# Patient Record
Sex: MALE | Race: WHITE | Employment: OTHER | URBAN - METROPOLITAN AREA
[De-identification: names, ages, dates, MRNs, and addresses within clinical notes are randomized per-mention and may not be internally consistent; named-entity substitution may affect disease eponyms.]

---

## 2018-01-01 ENCOUNTER — HOSPITAL ENCOUNTER (INPATIENT)
Age: 83
LOS: 1 days | DRG: 177 | End: 2018-12-01
Attending: EMERGENCY MEDICINE | Admitting: INTERNAL MEDICINE
Payer: MEDICARE

## 2018-01-01 ENCOUNTER — ANESTHESIA (OUTPATIENT)
Dept: ENDOSCOPY | Age: 83
DRG: 194 | End: 2018-01-01
Payer: MEDICARE

## 2018-01-01 ENCOUNTER — APPOINTMENT (OUTPATIENT)
Dept: GENERAL RADIOLOGY | Age: 83
DRG: 177 | End: 2018-01-01
Payer: MEDICARE

## 2018-01-01 ENCOUNTER — APPOINTMENT (OUTPATIENT)
Dept: GENERAL RADIOLOGY | Age: 83
DRG: 194 | End: 2018-01-01
Payer: MEDICARE

## 2018-01-01 ENCOUNTER — APPOINTMENT (OUTPATIENT)
Dept: CT IMAGING | Age: 83
DRG: 194 | End: 2018-01-01
Payer: MEDICARE

## 2018-01-01 ENCOUNTER — ANESTHESIA EVENT (OUTPATIENT)
Dept: ENDOSCOPY | Age: 83
DRG: 194 | End: 2018-01-01
Payer: MEDICARE

## 2018-01-01 ENCOUNTER — HOSPITAL ENCOUNTER (INPATIENT)
Age: 83
LOS: 3 days | Discharge: HOME | DRG: 194 | End: 2018-09-13
Attending: EMERGENCY MEDICINE | Admitting: INTERNAL MEDICINE
Payer: MEDICARE

## 2018-01-01 VITALS
RESPIRATION RATE: 24 BRPM | SYSTOLIC BLOOD PRESSURE: 161 MMHG | OXYGEN SATURATION: 95 % | DIASTOLIC BLOOD PRESSURE: 85 MMHG

## 2018-01-01 VITALS
TEMPERATURE: 98.6 F | RESPIRATION RATE: 20 BRPM | OXYGEN SATURATION: 93 % | HEIGHT: 66 IN | BODY MASS INDEX: 28.21 KG/M2 | DIASTOLIC BLOOD PRESSURE: 79 MMHG | SYSTOLIC BLOOD PRESSURE: 182 MMHG | HEART RATE: 74 BPM | WEIGHT: 175.5 LBS

## 2018-01-01 VITALS
TEMPERATURE: 97.4 F | WEIGHT: 172.5 LBS | DIASTOLIC BLOOD PRESSURE: 62 MMHG | RESPIRATION RATE: 30 BRPM | BODY MASS INDEX: 27.72 KG/M2 | SYSTOLIC BLOOD PRESSURE: 86 MMHG | HEART RATE: 86 BPM | HEIGHT: 66 IN | OXYGEN SATURATION: 100 %

## 2018-01-01 DIAGNOSIS — R77.8 ELEVATED TROPONIN: ICD-10-CM

## 2018-01-01 DIAGNOSIS — J96.00 ACUTE RESPIRATORY FAILURE, UNSPECIFIED WHETHER WITH HYPOXIA OR HYPERCAPNIA (HCC): ICD-10-CM

## 2018-01-01 DIAGNOSIS — J69.0 ASPIRATION PNEUMONIA, UNSPECIFIED ASPIRATION PNEUMONIA TYPE, UNSPECIFIED LATERALITY, UNSPECIFIED PART OF LUNG (HCC): Primary | ICD-10-CM

## 2018-01-01 DIAGNOSIS — J18.9 COMMUNITY ACQUIRED PNEUMONIA, UNSPECIFIED LATERALITY: Primary | ICD-10-CM

## 2018-01-01 LAB
AADO2: 368.7 MMHG
AADO2: 540.9 MMHG
ABO/RH: NORMAL
ALBUMIN SERPL-MCNC: 3.6 G/DL (ref 3.5–5.2)
ALBUMIN SERPL-MCNC: 3.9 G/DL (ref 3.5–5.2)
ALP BLD-CCNC: 128 U/L (ref 40–129)
ALP BLD-CCNC: 145 U/L (ref 40–129)
ALT SERPL-CCNC: 30 U/L (ref 0–40)
ALT SERPL-CCNC: 40 U/L (ref 0–40)
AMORPHOUS: ABNORMAL
ANION GAP SERPL CALCULATED.3IONS-SCNC: 11 MMOL/L (ref 7–16)
ANION GAP SERPL CALCULATED.3IONS-SCNC: 12 MMOL/L (ref 7–16)
ANION GAP SERPL CALCULATED.3IONS-SCNC: 12 MMOL/L (ref 7–16)
ANION GAP SERPL CALCULATED.3IONS-SCNC: 14 MMOL/L (ref 7–16)
ANION GAP SERPL CALCULATED.3IONS-SCNC: 17 MMOL/L (ref 7–16)
ANTIBODY SCREEN: NORMAL
AST SERPL-CCNC: 25 U/L (ref 0–39)
AST SERPL-CCNC: 45 U/L (ref 0–39)
ATYPICAL LYMPHOCYTE RELATIVE PERCENT: 0.9 % (ref 0–4)
B.E.: -10.9 MMOL/L (ref -3–3)
B.E.: -4.6 MMOL/L (ref -3–3)
BACTERIA: ABNORMAL /HPF
BACTERIA: NORMAL /HPF
BASOPHILS ABSOLUTE: 0 E9/L (ref 0–0.2)
BASOPHILS ABSOLUTE: 0.05 E9/L (ref 0–0.2)
BASOPHILS RELATIVE PERCENT: 0.3 % (ref 0–2)
BASOPHILS RELATIVE PERCENT: 0.5 % (ref 0–2)
BILIRUB SERPL-MCNC: 0.3 MG/DL (ref 0–1.2)
BILIRUB SERPL-MCNC: 0.4 MG/DL (ref 0–1.2)
BILIRUBIN URINE: NEGATIVE
BILIRUBIN URINE: NEGATIVE
BLOOD CULTURE, ROUTINE: NORMAL
BLOOD, URINE: ABNORMAL
BLOOD, URINE: NEGATIVE
BUN BLDV-MCNC: 18 MG/DL (ref 8–23)
BUN BLDV-MCNC: 24 MG/DL (ref 8–23)
BUN BLDV-MCNC: 28 MG/DL (ref 8–23)
BUN BLDV-MCNC: 28 MG/DL (ref 8–23)
BUN BLDV-MCNC: 36 MG/DL (ref 8–23)
BURR CELLS: ABNORMAL
CALCIUM SERPL-MCNC: 8.6 MG/DL (ref 8.6–10.2)
CALCIUM SERPL-MCNC: 8.7 MG/DL (ref 8.6–10.2)
CALCIUM SERPL-MCNC: 8.8 MG/DL (ref 8.6–10.2)
CALCIUM SERPL-MCNC: 9.3 MG/DL (ref 8.6–10.2)
CALCIUM SERPL-MCNC: 9.4 MG/DL (ref 8.6–10.2)
CHLORIDE BLD-SCNC: 100 MMOL/L (ref 98–107)
CHLORIDE BLD-SCNC: 102 MMOL/L (ref 98–107)
CHLORIDE BLD-SCNC: 104 MMOL/L (ref 98–107)
CHLORIDE BLD-SCNC: 107 MMOL/L (ref 98–107)
CHLORIDE BLD-SCNC: 107 MMOL/L (ref 98–107)
CLARITY: CLEAR
CLARITY: CLEAR
CO2: 18 MMOL/L (ref 22–29)
CO2: 18 MMOL/L (ref 22–29)
CO2: 19 MMOL/L (ref 22–29)
CO2: 21 MMOL/L (ref 22–29)
CO2: 24 MMOL/L (ref 22–29)
COHB: 0.5 % (ref 0–1.5)
COHB: 0.6 % (ref 0–1.5)
COLOR: YELLOW
COLOR: YELLOW
CREAT SERPL-MCNC: 1.2 MG/DL (ref 0.7–1.2)
CREAT SERPL-MCNC: 1.3 MG/DL (ref 0.7–1.2)
CREAT SERPL-MCNC: 1.3 MG/DL (ref 0.7–1.2)
CREAT SERPL-MCNC: 1.4 MG/DL (ref 0.7–1.2)
CREAT SERPL-MCNC: 1.7 MG/DL (ref 0.7–1.2)
CRITICAL: ABNORMAL
CRITICAL: ABNORMAL
CULTURE, BLOOD 2: NORMAL
DATE ANALYZED: ABNORMAL
DATE ANALYZED: ABNORMAL
DATE OF COLLECTION: ABNORMAL
DATE OF COLLECTION: ABNORMAL
EKG ATRIAL RATE: 60 BPM
EKG P AXIS: 33 DEGREES
EKG P-R INTERVAL: 172 MS
EKG Q-T INTERVAL: 440 MS
EKG QRS DURATION: 92 MS
EKG QTC CALCULATION (BAZETT): 440 MS
EKG R AXIS: -51 DEGREES
EKG T AXIS: 150 DEGREES
EKG VENTRICULAR RATE: 60 BPM
EOSINOPHILS ABSOLUTE: 0 E9/L (ref 0.05–0.5)
EOSINOPHILS ABSOLUTE: 0.66 E9/L (ref 0.05–0.5)
EOSINOPHILS RELATIVE PERCENT: 0.4 % (ref 0–6)
EOSINOPHILS RELATIVE PERCENT: 6 % (ref 0–6)
FILM ARRAY ADENOVIRUS: NORMAL
FILM ARRAY BORDETELLA PERTUSSIS: NORMAL
FILM ARRAY CHLAMYDOPHILIA PNEUMONIAE: NORMAL
FILM ARRAY CORONAVIRUS 229E: NORMAL
FILM ARRAY CORONAVIRUS HKU1: NORMAL
FILM ARRAY CORONAVIRUS NL63: NORMAL
FILM ARRAY CORONAVIRUS OC43: NORMAL
FILM ARRAY INFLUENZA A VIRUS 09H1: NORMAL
FILM ARRAY INFLUENZA A VIRUS H1: NORMAL
FILM ARRAY INFLUENZA A VIRUS H3: NORMAL
FILM ARRAY INFLUENZA A VIRUS: NORMAL
FILM ARRAY INFLUENZA B: NORMAL
FILM ARRAY METAPNEUMOVIRUS: NORMAL
FILM ARRAY MYCOPLASMA PNEUMONIAE: NORMAL
FILM ARRAY PARAINFLUENZA VIRUS 1: NORMAL
FILM ARRAY PARAINFLUENZA VIRUS 2: NORMAL
FILM ARRAY PARAINFLUENZA VIRUS 3: NORMAL
FILM ARRAY PARAINFLUENZA VIRUS 4: NORMAL
FILM ARRAY RESPIRATORY SYNCITIAL VIRUS: NORMAL
FILM ARRAY RHINOVIRUS/ENTEROVIRUS: NORMAL
FIO2: 100 %
FIO2: 100 %
GFR AFRICAN AMERICAN: 47
GFR AFRICAN AMERICAN: 58
GFR AFRICAN AMERICAN: >60
GFR NON-AFRICAN AMERICAN: 39 ML/MIN/1.73
GFR NON-AFRICAN AMERICAN: 48 ML/MIN/1.73
GFR NON-AFRICAN AMERICAN: 53 ML/MIN/1.73
GFR NON-AFRICAN AMERICAN: 53 ML/MIN/1.73
GFR NON-AFRICAN AMERICAN: 58 ML/MIN/1.73
GLUCOSE BLD-MCNC: 124 MG/DL (ref 74–99)
GLUCOSE BLD-MCNC: 85 MG/DL (ref 74–109)
GLUCOSE BLD-MCNC: 86 MG/DL (ref 74–109)
GLUCOSE BLD-MCNC: 86 MG/DL (ref 74–109)
GLUCOSE BLD-MCNC: 96 MG/DL (ref 74–109)
GLUCOSE URINE: NEGATIVE MG/DL
GLUCOSE URINE: NEGATIVE MG/DL
HCO3: 17.6 MMOL/L (ref 22–26)
HCO3: 19.3 MMOL/L (ref 22–26)
HCT VFR BLD CALC: 37.4 % (ref 37–54)
HCT VFR BLD CALC: 38.3 % (ref 37–54)
HCT VFR BLD CALC: 39.5 % (ref 37–54)
HCT VFR BLD CALC: 44 % (ref 37–54)
HCT VFR BLD CALC: 48.3 % (ref 37–54)
HEMOGLOBIN: 12.5 G/DL (ref 12.5–16.5)
HEMOGLOBIN: 12.5 G/DL (ref 12.5–16.5)
HEMOGLOBIN: 13.4 G/DL (ref 12.5–16.5)
HEMOGLOBIN: 14.8 G/DL (ref 12.5–16.5)
HEMOGLOBIN: 15.9 G/DL (ref 12.5–16.5)
HHB: 0.4 % (ref 0–5)
HHB: 3.7 % (ref 0–5)
IMMATURE GRANULOCYTES #: 0.06 E9/L
IMMATURE GRANULOCYTES %: 0.5 % (ref 0–5)
INR BLD: 1.1
INR BLD: 1.1
KETONES, URINE: ABNORMAL MG/DL
KETONES, URINE: NEGATIVE MG/DL
LAB: ABNORMAL
LAB: ABNORMAL
LACTIC ACID: 1.4 MMOL/L (ref 0.5–2.2)
LACTIC ACID: 2.3 MMOL/L (ref 0.5–2.2)
LACTIC ACID: 3.1 MMOL/L (ref 0.5–2.2)
LEUKOCYTE ESTERASE, URINE: NEGATIVE
LEUKOCYTE ESTERASE, URINE: NEGATIVE
LYMPHOCYTES ABSOLUTE: 1.36 E9/L (ref 1.5–4)
LYMPHOCYTES ABSOLUTE: 1.42 E9/L (ref 1.5–4)
LYMPHOCYTES RELATIVE PERCENT: 12.4 % (ref 20–42)
LYMPHOCYTES RELATIVE PERCENT: 5.2 % (ref 20–42)
Lab: ABNORMAL
Lab: ABNORMAL
MCH RBC QN AUTO: 30.5 PG (ref 26–35)
MCH RBC QN AUTO: 30.6 PG (ref 26–35)
MCH RBC QN AUTO: 31.5 PG (ref 26–35)
MCH RBC QN AUTO: 31.6 PG (ref 26–35)
MCH RBC QN AUTO: 31.8 PG (ref 26–35)
MCHC RBC AUTO-ENTMCNC: 32.6 % (ref 32–34.5)
MCHC RBC AUTO-ENTMCNC: 32.9 % (ref 32–34.5)
MCHC RBC AUTO-ENTMCNC: 33.4 % (ref 32–34.5)
MCHC RBC AUTO-ENTMCNC: 33.6 % (ref 32–34.5)
MCHC RBC AUTO-ENTMCNC: 33.9 % (ref 32–34.5)
MCV RBC AUTO: 92.7 FL (ref 80–99.9)
MCV RBC AUTO: 93.1 FL (ref 80–99.9)
MCV RBC AUTO: 93.4 FL (ref 80–99.9)
MCV RBC AUTO: 94.6 FL (ref 80–99.9)
MCV RBC AUTO: 94.7 FL (ref 80–99.9)
METAMYELOCYTES RELATIVE PERCENT: 1.7 % (ref 0–1)
METER GLUCOSE: 145 MG/DL (ref 74–99)
METHB: 0.2 % (ref 0–1.5)
METHB: 0.3 % (ref 0–1.5)
MODE: ABNORMAL
MODE: ABNORMAL
MONOCYTES ABSOLUTE: 0.71 E9/L (ref 0.1–0.95)
MONOCYTES ABSOLUTE: 1.13 E9/L (ref 0.1–0.95)
MONOCYTES RELATIVE PERCENT: 10.3 % (ref 2–12)
MONOCYTES RELATIVE PERCENT: 2.6 % (ref 2–12)
NEUTROPHILS ABSOLUTE: 21.57 E9/L (ref 1.8–7.3)
NEUTROPHILS ABSOLUTE: 7.72 E9/L (ref 1.8–7.3)
NEUTROPHILS RELATIVE PERCENT: 70.3 % (ref 43–80)
NEUTROPHILS RELATIVE PERCENT: 89.6 % (ref 43–80)
NITRITE, URINE: NEGATIVE
NITRITE, URINE: NEGATIVE
O2 CONTENT: 22.5 ML/DL
O2 CONTENT: 22.6 ML/DL
O2 SATURATION: 96.3 % (ref 92–98.5)
O2 SATURATION: 99.6 % (ref 92–98.5)
O2HB: 95.4 % (ref 94–97)
O2HB: 98.9 % (ref 94–97)
OPERATOR ID: ABNORMAL
OPERATOR ID: ABNORMAL
PATIENT TEMP: 37 C
PATIENT TEMP: 37 C
PCO2: 32.7 MMHG (ref 35–45)
PCO2: 48.1 MMHG (ref 35–45)
PDW BLD-RTO: 13.9 FL (ref 11.5–15)
PDW BLD-RTO: 14.1 FL (ref 11.5–15)
PDW BLD-RTO: 14.2 FL (ref 11.5–15)
PDW BLD-RTO: 14.2 FL (ref 11.5–15)
PDW BLD-RTO: 14.3 FL (ref 11.5–15)
PEEP/CPAP: 5 CMH2O
PEEP/CPAP: 8 CMH2O
PFO2: 0.99 MMHG/%
PFO2: 2.87 MMHG/%
PH BLOOD GAS: 7.18 (ref 7.35–7.45)
PH BLOOD GAS: 7.39 (ref 7.35–7.45)
PH UA: 5 (ref 5–9)
PH UA: 6.5 (ref 5–9)
PIP: 15 CMH2O
PIP: 15 CMH2O
PLATELET # BLD: 218 E9/L (ref 130–450)
PLATELET # BLD: 236 E9/L (ref 130–450)
PLATELET # BLD: 263 E9/L (ref 130–450)
PLATELET # BLD: 267 E9/L (ref 130–450)
PLATELET # BLD: 318 E9/L (ref 130–450)
PMV BLD AUTO: 10.4 FL (ref 7–12)
PMV BLD AUTO: 10.6 FL (ref 7–12)
PMV BLD AUTO: 10.6 FL (ref 7–12)
PMV BLD AUTO: 10.8 FL (ref 7–12)
PMV BLD AUTO: 10.9 FL (ref 7–12)
PO2: 286.6 MMHG (ref 60–100)
PO2: 99 MMHG (ref 60–100)
POLYCHROMASIA: ABNORMAL
POTASSIUM SERPL-SCNC: 4.3 MMOL/L (ref 3.5–5)
POTASSIUM SERPL-SCNC: 4.5 MMOL/L (ref 3.5–5)
POTASSIUM SERPL-SCNC: 4.6 MMOL/L (ref 3.5–5)
POTASSIUM SERPL-SCNC: 5 MMOL/L (ref 3.5–5)
POTASSIUM SERPL-SCNC: 5.1 MMOL/L (ref 3.5–5)
PROTEIN UA: 100 MG/DL
PROTEIN UA: NEGATIVE MG/DL
PROTHROMBIN TIME: 12.7 SEC (ref 9.3–12.4)
PROTHROMBIN TIME: 13 SEC (ref 9.3–12.4)
RBC # BLD: 3.95 E12/L (ref 3.8–5.8)
RBC # BLD: 4.1 E12/L (ref 3.8–5.8)
RBC # BLD: 4.26 E12/L (ref 3.8–5.8)
RBC # BLD: 4.65 E12/L (ref 3.8–5.8)
RBC # BLD: 5.19 E12/L (ref 3.8–5.8)
RBC UA: >20 /HPF (ref 0–2)
RBC UA: NORMAL /HPF (ref 0–2)
RI(T): 129 %
RI(T): 546 %
SODIUM BLD-SCNC: 136 MMOL/L (ref 132–146)
SODIUM BLD-SCNC: 137 MMOL/L (ref 132–146)
SODIUM BLD-SCNC: 139 MMOL/L (ref 132–146)
SOURCE, BLOOD GAS: ABNORMAL
SOURCE, BLOOD GAS: ABNORMAL
SPECIFIC GRAVITY UA: 1.02 (ref 1–1.03)
SPECIFIC GRAVITY UA: <=1.005 (ref 1–1.03)
THB: 15.8 G/DL (ref 11.5–16.5)
THB: 16.7 G/DL (ref 11.5–16.5)
TIME ANALYZED: 104
TIME ANALYZED: 502
TOTAL PROTEIN: 7.8 G/DL (ref 6.4–8.3)
TOTAL PROTEIN: 8.1 G/DL (ref 6.4–8.3)
TROPONIN: 0.46 NG/ML (ref 0–0.03)
URINE CULTURE, ROUTINE: NORMAL
UROBILINOGEN, URINE: 0.2 E.U./DL
UROBILINOGEN, URINE: 0.2 E.U./DL
WBC # BLD: 10.5 E9/L (ref 4.5–11.5)
WBC # BLD: 11 E9/L (ref 4.5–11.5)
WBC # BLD: 11.2 E9/L (ref 4.5–11.5)
WBC # BLD: 23.7 E9/L (ref 4.5–11.5)
WBC # BLD: 9.3 E9/L (ref 4.5–11.5)
WBC UA: ABNORMAL /HPF (ref 0–5)
WBC UA: NORMAL /HPF (ref 0–5)

## 2018-01-01 PROCEDURE — 85027 COMPLETE CBC AUTOMATED: CPT

## 2018-01-01 PROCEDURE — 36415 COLL VENOUS BLD VENIPUNCTURE: CPT

## 2018-01-01 PROCEDURE — 6370000000 HC RX 637 (ALT 250 FOR IP): Performed by: INTERNAL MEDICINE

## 2018-01-01 PROCEDURE — 80053 COMPREHEN METABOLIC PANEL: CPT

## 2018-01-01 PROCEDURE — 83605 ASSAY OF LACTIC ACID: CPT

## 2018-01-01 PROCEDURE — 0DJ08ZZ INSPECTION OF UPPER INTESTINAL TRACT, VIA NATURAL OR ARTIFICIAL OPENING ENDOSCOPIC: ICD-10-PCS | Performed by: SURGERY

## 2018-01-01 PROCEDURE — 2580000003 HC RX 258: Performed by: EMERGENCY MEDICINE

## 2018-01-01 PROCEDURE — 71045 X-RAY EXAM CHEST 1 VIEW: CPT

## 2018-01-01 PROCEDURE — 3700000000 HC ANESTHESIA ATTENDED CARE: Performed by: SURGERY

## 2018-01-01 PROCEDURE — 0D20XUZ CHANGE FEEDING DEVICE IN UPPER INTESTINAL TRACT, EXTERNAL APPROACH: ICD-10-PCS | Performed by: SURGERY

## 2018-01-01 PROCEDURE — 94664 DEMO&/EVAL PT USE INHALER: CPT

## 2018-01-01 PROCEDURE — 87486 CHLMYD PNEUM DNA AMP PROBE: CPT

## 2018-01-01 PROCEDURE — 94660 CPAP INITIATION&MGMT: CPT

## 2018-01-01 PROCEDURE — 6360000002 HC RX W HCPCS: Performed by: INTERNAL MEDICINE

## 2018-01-01 PROCEDURE — 82805 BLOOD GASES W/O2 SATURATION: CPT

## 2018-01-01 PROCEDURE — 94640 AIRWAY INHALATION TREATMENT: CPT

## 2018-01-01 PROCEDURE — 2580000003 HC RX 258: Performed by: INTERNAL MEDICINE

## 2018-01-01 PROCEDURE — 82962 GLUCOSE BLOOD TEST: CPT

## 2018-01-01 PROCEDURE — 94760 N-INVAS EAR/PLS OXIMETRY 1: CPT

## 2018-01-01 PROCEDURE — 85025 COMPLETE CBC W/AUTO DIFF WBC: CPT

## 2018-01-01 PROCEDURE — 2709999900 HC NON-CHARGEABLE SUPPLY: Performed by: SURGERY

## 2018-01-01 PROCEDURE — 87040 BLOOD CULTURE FOR BACTERIA: CPT

## 2018-01-01 PROCEDURE — 2500000003 HC RX 250 WO HCPCS: Performed by: EMERGENCY MEDICINE

## 2018-01-01 PROCEDURE — 87088 URINE BACTERIA CULTURE: CPT

## 2018-01-01 PROCEDURE — 96375 TX/PRO/DX INJ NEW DRUG ADDON: CPT

## 2018-01-01 PROCEDURE — 86900 BLOOD TYPING SEROLOGIC ABO: CPT

## 2018-01-01 PROCEDURE — 86901 BLOOD TYPING SEROLOGIC RH(D): CPT

## 2018-01-01 PROCEDURE — 1200000000 HC SEMI PRIVATE

## 2018-01-01 PROCEDURE — 87798 DETECT AGENT NOS DNA AMP: CPT

## 2018-01-01 PROCEDURE — L0625 LO FLEX L1-BELOW L5 PRE OTS: HCPCS | Performed by: SURGERY

## 2018-01-01 PROCEDURE — 2580000003 HC RX 258: Performed by: NURSE ANESTHETIST, CERTIFIED REGISTERED

## 2018-01-01 PROCEDURE — 99285 EMERGENCY DEPT VISIT HI MDM: CPT

## 2018-01-01 PROCEDURE — 2060000000 HC ICU INTERMEDIATE R&B

## 2018-01-01 PROCEDURE — 6370000000 HC RX 637 (ALT 250 FOR IP)

## 2018-01-01 PROCEDURE — 2500000003 HC RX 250 WO HCPCS: Performed by: INTERNAL MEDICINE

## 2018-01-01 PROCEDURE — 74176 CT ABD & PELVIS W/O CONTRAST: CPT

## 2018-01-01 PROCEDURE — 2500000003 HC RX 250 WO HCPCS: Performed by: NURSE ANESTHETIST, CERTIFIED REGISTERED

## 2018-01-01 PROCEDURE — 87633 RESP VIRUS 12-25 TARGETS: CPT

## 2018-01-01 PROCEDURE — 86850 RBC ANTIBODY SCREEN: CPT

## 2018-01-01 PROCEDURE — 7100000011 HC PHASE II RECOVERY - ADDTL 15 MIN: Performed by: SURGERY

## 2018-01-01 PROCEDURE — 80048 BASIC METABOLIC PNL TOTAL CA: CPT

## 2018-01-01 PROCEDURE — 81001 URINALYSIS AUTO W/SCOPE: CPT

## 2018-01-01 PROCEDURE — 84484 ASSAY OF TROPONIN QUANT: CPT

## 2018-01-01 PROCEDURE — 94761 N-INVAS EAR/PLS OXIMETRY MLT: CPT

## 2018-01-01 PROCEDURE — 96365 THER/PROPH/DIAG IV INF INIT: CPT

## 2018-01-01 PROCEDURE — 6360000002 HC RX W HCPCS

## 2018-01-01 PROCEDURE — 3609013300 HC EGD TUBE PLACEMENT: Performed by: SURGERY

## 2018-01-01 PROCEDURE — C1889 IMPLANT/INSERT DEVICE, NOC: HCPCS | Performed by: SURGERY

## 2018-01-01 PROCEDURE — 7100000010 HC PHASE II RECOVERY - FIRST 15 MIN: Performed by: SURGERY

## 2018-01-01 PROCEDURE — 6360000002 HC RX W HCPCS: Performed by: EMERGENCY MEDICINE

## 2018-01-01 PROCEDURE — 3E0G76Z INTRODUCTION OF NUTRITIONAL SUBSTANCE INTO UPPER GI, VIA NATURAL OR ARTIFICIAL OPENING: ICD-10-PCS | Performed by: SURGERY

## 2018-01-01 PROCEDURE — 85610 PROTHROMBIN TIME: CPT

## 2018-01-01 PROCEDURE — 70450 CT HEAD/BRAIN W/O DYE: CPT

## 2018-01-01 PROCEDURE — 96367 TX/PROPH/DG ADDL SEQ IV INF: CPT

## 2018-01-01 PROCEDURE — 3700000001 HC ADD 15 MINUTES (ANESTHESIA): Performed by: SURGERY

## 2018-01-01 PROCEDURE — 87581 M.PNEUMON DNA AMP PROBE: CPT

## 2018-01-01 PROCEDURE — 6360000002 HC RX W HCPCS: Performed by: NURSE ANESTHETIST, CERTIFIED REGISTERED

## 2018-01-01 PROCEDURE — 93005 ELECTROCARDIOGRAM TRACING: CPT

## 2018-01-01 RX ORDER — TRAMADOL HYDROCHLORIDE 50 MG/1
50 TABLET ORAL EVERY 4 HOURS PRN
Status: DISCONTINUED | OUTPATIENT
Start: 2018-01-01 | End: 2018-01-01

## 2018-01-01 RX ORDER — TRAMADOL HYDROCHLORIDE 50 MG/1
50 TABLET ORAL 2 TIMES DAILY
Status: DISCONTINUED | OUTPATIENT
Start: 2018-01-01 | End: 2018-01-01 | Stop reason: HOSPADM

## 2018-01-01 RX ORDER — LORAZEPAM 1 MG/1
0.5 TABLET ORAL ONCE
Status: DISCONTINUED | OUTPATIENT
Start: 2018-01-01 | End: 2018-01-01

## 2018-01-01 RX ORDER — LORAZEPAM 2 MG/ML
0.5 INJECTION INTRAMUSCULAR ONCE
Status: COMPLETED | OUTPATIENT
Start: 2018-01-01 | End: 2018-01-01

## 2018-01-01 RX ORDER — SODIUM CHLORIDE 0.9 % (FLUSH) 0.9 %
10 SYRINGE (ML) INJECTION EVERY 12 HOURS SCHEDULED
Status: DISCONTINUED | OUTPATIENT
Start: 2018-01-01 | End: 2018-01-01

## 2018-01-01 RX ORDER — SODIUM CHLORIDE 0.9 % (FLUSH) 0.9 %
10 SYRINGE (ML) INJECTION PRN
Status: DISCONTINUED | OUTPATIENT
Start: 2018-01-01 | End: 2018-12-01 | Stop reason: HOSPADM

## 2018-01-01 RX ORDER — ACETAMINOPHEN 160 MG/5ML
500 SOLUTION ORAL EVERY 6 HOURS PRN
Status: DISCONTINUED | OUTPATIENT
Start: 2018-01-01 | End: 2018-01-01 | Stop reason: HOSPADM

## 2018-01-01 RX ORDER — CLOPIDOGREL BISULFATE 75 MG/1
75 TABLET ORAL EVERY MORNING
COMMUNITY

## 2018-01-01 RX ORDER — MORPHINE SULFATE 2 MG/ML
2 INJECTION, SOLUTION INTRAMUSCULAR; INTRAVENOUS ONCE
Status: COMPLETED | OUTPATIENT
Start: 2018-01-01 | End: 2018-01-01

## 2018-01-01 RX ORDER — 0.9 % SODIUM CHLORIDE 0.9 %
500 INTRAVENOUS SOLUTION INTRAVENOUS ONCE
Status: COMPLETED | OUTPATIENT
Start: 2018-01-01 | End: 2018-01-01

## 2018-01-01 RX ORDER — LORAZEPAM 2 MG/ML
0.5 INJECTION INTRAMUSCULAR EVERY 4 HOURS PRN
Status: DISCONTINUED | OUTPATIENT
Start: 2018-01-01 | End: 2018-12-01 | Stop reason: HOSPADM

## 2018-01-01 RX ORDER — ATORVASTATIN CALCIUM 40 MG/1
40 TABLET, FILM COATED ORAL NIGHTLY
Status: DISCONTINUED | OUTPATIENT
Start: 2018-01-01 | End: 2018-01-01 | Stop reason: HOSPADM

## 2018-01-01 RX ORDER — LORAZEPAM 2 MG/ML
INJECTION INTRAMUSCULAR
Status: COMPLETED
Start: 2018-01-01 | End: 2018-01-01

## 2018-01-01 RX ORDER — IPRATROPIUM BROMIDE AND ALBUTEROL SULFATE 2.5; .5 MG/3ML; MG/3ML
1 SOLUTION RESPIRATORY (INHALATION)
Status: DISCONTINUED | OUTPATIENT
Start: 2018-01-01 | End: 2018-01-01

## 2018-01-01 RX ORDER — ACETAMINOPHEN 325 MG/1
325 TABLET ORAL EVERY 4 HOURS PRN
Status: DISCONTINUED | OUTPATIENT
Start: 2018-01-01 | End: 2018-12-01 | Stop reason: HOSPADM

## 2018-01-01 RX ORDER — CLOPIDOGREL BISULFATE 75 MG/1
75 TABLET ORAL EVERY MORNING
Status: DISCONTINUED | OUTPATIENT
Start: 2018-01-01 | End: 2018-01-01 | Stop reason: HOSPADM

## 2018-01-01 RX ORDER — LOSARTAN POTASSIUM 50 MG/1
50 TABLET ORAL EVERY MORNING
Status: DISCONTINUED | OUTPATIENT
Start: 2018-01-01 | End: 2018-01-01

## 2018-01-01 RX ORDER — GLYCOPYRROLATE 0.2 MG/ML
0.2 INJECTION INTRAMUSCULAR; INTRAVENOUS
Status: DISCONTINUED | OUTPATIENT
Start: 2018-01-01 | End: 2018-12-01 | Stop reason: HOSPADM

## 2018-01-01 RX ORDER — ASPIRIN 81 MG/1
81 TABLET, CHEWABLE ORAL EVERY MORNING
COMMUNITY

## 2018-01-01 RX ORDER — SODIUM CHLORIDE 9 MG/ML
INJECTION, SOLUTION INTRAVENOUS CONTINUOUS
Status: DISCONTINUED | OUTPATIENT
Start: 2018-01-01 | End: 2018-01-01 | Stop reason: HOSPADM

## 2018-01-01 RX ORDER — CLOPIDOGREL BISULFATE 75 MG/1
75 TABLET ORAL EVERY MORNING
Status: DISCONTINUED | OUTPATIENT
Start: 2018-01-01 | End: 2018-01-01

## 2018-01-01 RX ORDER — NUTRITIONAL SUPPLEMENT 0.04G-1/ML
1 LIQUID (ML) ORAL NIGHTLY
Status: DISCONTINUED | OUTPATIENT
Start: 2018-01-01 | End: 2018-01-01

## 2018-01-01 RX ORDER — DIMETHICONE, OXYBENZONE, AND PADIMATE O 2; 2.5; 6.6 G/100G; G/100G; G/100G
STICK TOPICAL
Status: COMPLETED
Start: 2018-01-01 | End: 2018-01-01

## 2018-01-01 RX ORDER — FUROSEMIDE 10 MG/ML
20 INJECTION INTRAMUSCULAR; INTRAVENOUS ONCE
Status: COMPLETED | OUTPATIENT
Start: 2018-01-01 | End: 2018-01-01

## 2018-01-01 RX ORDER — ATORVASTATIN CALCIUM 40 MG/1
40 TABLET, FILM COATED ORAL NIGHTLY
COMMUNITY

## 2018-01-01 RX ORDER — LOSARTAN POTASSIUM 50 MG/1
50 TABLET ORAL EVERY MORNING
COMMUNITY

## 2018-01-01 RX ORDER — SODIUM CHLORIDE 9 MG/ML
INJECTION, SOLUTION INTRAVENOUS CONTINUOUS PRN
Status: DISCONTINUED | OUTPATIENT
Start: 2018-01-01 | End: 2018-01-01 | Stop reason: SDUPTHER

## 2018-01-01 RX ORDER — ALBUTEROL SULFATE 0.63 MG/3ML
0.63 SOLUTION RESPIRATORY (INHALATION) EVERY 6 HOURS
Status: DISCONTINUED | OUTPATIENT
Start: 2018-01-01 | End: 2018-01-01 | Stop reason: HOSPADM

## 2018-01-01 RX ORDER — ACETAMINOPHEN 500 MG
500 TABLET ORAL EVERY 6 HOURS PRN
COMMUNITY

## 2018-01-01 RX ORDER — ATORVASTATIN CALCIUM 40 MG/1
40 TABLET, FILM COATED ORAL NIGHTLY
Status: DISCONTINUED | OUTPATIENT
Start: 2018-01-01 | End: 2018-01-01

## 2018-01-01 RX ORDER — LOSARTAN POTASSIUM 50 MG/1
50 TABLET ORAL EVERY MORNING
Status: DISCONTINUED | OUTPATIENT
Start: 2018-01-01 | End: 2018-01-01 | Stop reason: HOSPADM

## 2018-01-01 RX ORDER — ASPIRIN 81 MG/1
81 TABLET, CHEWABLE ORAL EVERY MORNING
Status: DISCONTINUED | OUTPATIENT
Start: 2018-01-01 | End: 2018-01-01 | Stop reason: HOSPADM

## 2018-01-01 RX ORDER — LIDOCAINE HYDROCHLORIDE 10 MG/ML
INJECTION, SOLUTION EPIDURAL; INFILTRATION; INTRACAUDAL; PERINEURAL PRN
Status: DISCONTINUED | OUTPATIENT
Start: 2018-01-01 | End: 2018-01-01 | Stop reason: SDUPTHER

## 2018-01-01 RX ORDER — ONDANSETRON 2 MG/ML
4 INJECTION INTRAMUSCULAR; INTRAVENOUS EVERY 6 HOURS PRN
Status: DISCONTINUED | OUTPATIENT
Start: 2018-01-01 | End: 2018-12-01 | Stop reason: HOSPADM

## 2018-01-01 RX ORDER — PROPOFOL 10 MG/ML
INJECTION, EMULSION INTRAVENOUS PRN
Status: DISCONTINUED | OUTPATIENT
Start: 2018-01-01 | End: 2018-01-01 | Stop reason: SDUPTHER

## 2018-01-01 RX ORDER — ASPIRIN 81 MG/1
81 TABLET, CHEWABLE ORAL EVERY MORNING
Status: DISCONTINUED | OUTPATIENT
Start: 2018-01-01 | End: 2018-01-01

## 2018-01-01 RX ORDER — SODIUM CHLORIDE 9 MG/ML
INJECTION, SOLUTION INTRAVENOUS CONTINUOUS
Status: DISCONTINUED | OUTPATIENT
Start: 2018-01-01 | End: 2018-12-01 | Stop reason: HOSPADM

## 2018-01-01 RX ORDER — TRAMADOL HYDROCHLORIDE 50 MG/1
50 TABLET ORAL 2 TIMES DAILY
COMMUNITY

## 2018-01-01 RX ADMIN — GLYCOPYRROLATE 0.2 MG: 0.2 INJECTION, SOLUTION INTRAMUSCULAR; INTRAVENOUS at 08:25

## 2018-01-01 RX ADMIN — GLYCOPYRROLATE 0.2 MG: 0.2 INJECTION, SOLUTION INTRAMUSCULAR; INTRAVENOUS at 10:38

## 2018-01-01 RX ADMIN — LOSARTAN POTASSIUM 50 MG: 50 TABLET, FILM COATED ORAL at 07:43

## 2018-01-01 RX ADMIN — ALBUTEROL SULFATE 0.63 MG: 0.63 SOLUTION RESPIRATORY (INHALATION) at 13:07

## 2018-01-01 RX ADMIN — SODIUM CHLORIDE: 9 INJECTION, SOLUTION INTRAVENOUS at 06:12

## 2018-01-01 RX ADMIN — SODIUM CHLORIDE 500 ML: 9 INJECTION, SOLUTION INTRAVENOUS at 01:53

## 2018-01-01 RX ADMIN — SODIUM CHLORIDE: 9 INJECTION, SOLUTION INTRAVENOUS at 18:17

## 2018-01-01 RX ADMIN — ENOXAPARIN SODIUM 40 MG: 40 INJECTION SUBCUTANEOUS at 17:01

## 2018-01-01 RX ADMIN — SODIUM CHLORIDE: 9 INJECTION, SOLUTION INTRAVENOUS at 07:59

## 2018-01-01 RX ADMIN — METOPROLOL TARTRATE 25 MG: 25 TABLET ORAL at 10:28

## 2018-01-01 RX ADMIN — ALBUTEROL SULFATE 0.63 MG: 0.63 SOLUTION RESPIRATORY (INHALATION) at 20:06

## 2018-01-01 RX ADMIN — MORPHINE SULFATE 2 MG: 2 INJECTION, SOLUTION INTRAMUSCULAR; INTRAVENOUS at 06:41

## 2018-01-01 RX ADMIN — CLOPIDOGREL BISULFATE 75 MG: 75 TABLET ORAL at 10:28

## 2018-01-01 RX ADMIN — WATER 1 G: 1 INJECTION INTRAMUSCULAR; INTRAVENOUS; SUBCUTANEOUS at 17:23

## 2018-01-01 RX ADMIN — SODIUM CHLORIDE: 9 INJECTION, SOLUTION INTRAVENOUS at 19:04

## 2018-01-01 RX ADMIN — ALBUTEROL SULFATE 0.63 MG: 0.63 SOLUTION RESPIRATORY (INHALATION) at 08:20

## 2018-01-01 RX ADMIN — GLYCOPYRROLATE 0.2 MG: 0.2 INJECTION, SOLUTION INTRAMUSCULAR; INTRAVENOUS at 17:00

## 2018-01-01 RX ADMIN — CLOPIDOGREL BISULFATE 75 MG: 75 TABLET ORAL at 09:30

## 2018-01-01 RX ADMIN — GLYCOPYRROLATE 0.2 MG: 0.2 INJECTION, SOLUTION INTRAMUSCULAR; INTRAVENOUS at 11:48

## 2018-01-01 RX ADMIN — TRAMADOL HYDROCHLORIDE 50 MG: 50 TABLET, FILM COATED ORAL at 20:52

## 2018-01-01 RX ADMIN — WATER 1 G: 1 INJECTION INTRAMUSCULAR; INTRAVENOUS; SUBCUTANEOUS at 17:20

## 2018-01-01 RX ADMIN — VANCOMYCIN HYDROCHLORIDE 1.5 G: 10 INJECTION, POWDER, LYOPHILIZED, FOR SOLUTION INTRAVENOUS at 03:07

## 2018-01-01 RX ADMIN — LOSARTAN POTASSIUM 50 MG: 50 TABLET, FILM COATED ORAL at 10:28

## 2018-01-01 RX ADMIN — METOPROLOL TARTRATE 25 MG: 25 TABLET ORAL at 07:43

## 2018-01-01 RX ADMIN — WATER 1 G: 1 INJECTION INTRAMUSCULAR; INTRAVENOUS; SUBCUTANEOUS at 15:43

## 2018-01-01 RX ADMIN — LOSARTAN POTASSIUM 50 MG: 50 TABLET, FILM COATED ORAL at 09:30

## 2018-01-01 RX ADMIN — Medication: at 19:22

## 2018-01-01 RX ADMIN — MORPHINE SULFATE 2 MG/HR: 25 INJECTION, SOLUTION INTRAVENOUS at 06:51

## 2018-01-01 RX ADMIN — TRAMADOL HYDROCHLORIDE 50 MG: 50 TABLET, FILM COATED ORAL at 21:52

## 2018-01-01 RX ADMIN — ALBUTEROL SULFATE 0.63 MG: 0.63 SOLUTION RESPIRATORY (INHALATION) at 17:23

## 2018-01-01 RX ADMIN — ENOXAPARIN SODIUM 40 MG: 40 INJECTION SUBCUTANEOUS at 17:20

## 2018-01-01 RX ADMIN — METOPROLOL TARTRATE 25 MG: 25 TABLET ORAL at 09:31

## 2018-01-01 RX ADMIN — TRAMADOL HYDROCHLORIDE 50 MG: 50 TABLET, FILM COATED ORAL at 09:30

## 2018-01-01 RX ADMIN — GLYCOPYRROLATE 0.2 MG: 0.2 INJECTION, SOLUTION INTRAMUSCULAR; INTRAVENOUS at 16:00

## 2018-01-01 RX ADMIN — ALBUTEROL SULFATE 0.63 MG: 0.63 SOLUTION RESPIRATORY (INHALATION) at 09:40

## 2018-01-01 RX ADMIN — PROPOFOL 50 MG: 10 INJECTION, EMULSION INTRAVENOUS at 15:05

## 2018-01-01 RX ADMIN — GLYCOPYRROLATE 0.2 MG: 0.2 INJECTION, SOLUTION INTRAMUSCULAR; INTRAVENOUS at 12:54

## 2018-01-01 RX ADMIN — ENOXAPARIN SODIUM 40 MG: 40 INJECTION SUBCUTANEOUS at 18:58

## 2018-01-01 RX ADMIN — PROPOFOL 50 MG: 10 INJECTION, EMULSION INTRAVENOUS at 15:07

## 2018-01-01 RX ADMIN — ATORVASTATIN CALCIUM 40 MG: 40 TABLET, FILM COATED ORAL at 21:52

## 2018-01-01 RX ADMIN — TRAMADOL HYDROCHLORIDE 50 MG: 50 TABLET, FILM COATED ORAL at 10:27

## 2018-01-01 RX ADMIN — ASPIRIN 81 MG 81 MG: 81 TABLET ORAL at 09:30

## 2018-01-01 RX ADMIN — SODIUM CHLORIDE 3 G: 9 INJECTION, SOLUTION INTRAVENOUS at 01:14

## 2018-01-01 RX ADMIN — SODIUM CHLORIDE: 9 INJECTION, SOLUTION INTRAVENOUS at 14:56

## 2018-01-01 RX ADMIN — FUROSEMIDE 20 MG: 10 INJECTION, SOLUTION INTRAMUSCULAR; INTRAVENOUS at 04:52

## 2018-01-01 RX ADMIN — TRAMADOL HYDROCHLORIDE 50 MG: 50 TABLET, FILM COATED ORAL at 07:43

## 2018-01-01 RX ADMIN — METOPROLOL TARTRATE 25 MG: 25 TABLET ORAL at 20:52

## 2018-01-01 RX ADMIN — METOPROLOL TARTRATE 25 MG: 25 TABLET ORAL at 21:52

## 2018-01-01 RX ADMIN — ASPIRIN 81 MG 81 MG: 81 TABLET ORAL at 10:27

## 2018-01-01 RX ADMIN — LIDOCAINE HYDROCHLORIDE 20 MG: 10 INJECTION, SOLUTION EPIDURAL; INFILTRATION; INTRACAUDAL; PERINEURAL at 15:05

## 2018-01-01 RX ADMIN — CEFTRIAXONE 1 G: 1 INJECTION, POWDER, FOR SOLUTION INTRAMUSCULAR; INTRAVENOUS at 16:02

## 2018-01-01 RX ADMIN — LORAZEPAM 0.5 MG: 2 INJECTION, SOLUTION INTRAMUSCULAR; INTRAVENOUS at 04:31

## 2018-01-01 RX ADMIN — ENOXAPARIN SODIUM 40 MG: 40 INJECTION SUBCUTANEOUS at 17:23

## 2018-01-01 RX ADMIN — DOXYCYCLINE 100 MG: 100 INJECTION, POWDER, LYOPHILIZED, FOR SOLUTION INTRAVENOUS at 18:55

## 2018-01-01 RX ADMIN — GLYCOPYRROLATE 0.2 MG: 0.2 INJECTION, SOLUTION INTRAMUSCULAR; INTRAVENOUS at 18:01

## 2018-01-01 RX ADMIN — GLYCOPYRROLATE 0.2 MG: 0.2 INJECTION, SOLUTION INTRAMUSCULAR; INTRAVENOUS at 13:48

## 2018-01-01 RX ADMIN — GLYCOPYRROLATE 0.2 MG: 0.2 INJECTION, SOLUTION INTRAMUSCULAR; INTRAVENOUS at 19:02

## 2018-01-01 RX ADMIN — LORAZEPAM 0.5 MG: 2 INJECTION INTRAMUSCULAR; INTRAVENOUS at 09:16

## 2018-01-01 RX ADMIN — GLYCOPYRROLATE 0.2 MG: 0.2 INJECTION, SOLUTION INTRAMUSCULAR; INTRAVENOUS at 15:01

## 2018-01-01 RX ADMIN — LORAZEPAM 0.5 MG: 2 INJECTION INTRAMUSCULAR at 04:31

## 2018-01-01 RX ADMIN — GLYCOPYRROLATE 0.2 MG: 0.2 INJECTION, SOLUTION INTRAMUSCULAR; INTRAVENOUS at 09:31

## 2018-01-01 RX ADMIN — LORAZEPAM 0.5 MG: 2 INJECTION INTRAMUSCULAR; INTRAVENOUS at 13:19

## 2018-01-01 RX ADMIN — TRAMADOL HYDROCHLORIDE 50 MG: 50 TABLET, FILM COATED ORAL at 20:04

## 2018-01-01 RX ADMIN — ALBUTEROL SULFATE 0.63 MG: 0.63 SOLUTION RESPIRATORY (INHALATION) at 13:23

## 2018-01-01 RX ADMIN — ATORVASTATIN CALCIUM 40 MG: 40 TABLET, FILM COATED ORAL at 20:04

## 2018-01-01 RX ADMIN — ATORVASTATIN CALCIUM 40 MG: 40 TABLET, FILM COATED ORAL at 20:52

## 2018-01-01 RX ADMIN — SODIUM CHLORIDE: 9 INJECTION, SOLUTION INTRAVENOUS at 20:12

## 2018-01-01 RX ADMIN — LORAZEPAM 0.5 MG: 2 INJECTION, SOLUTION INTRAMUSCULAR; INTRAVENOUS at 02:46

## 2018-01-01 RX ADMIN — ALBUTEROL SULFATE 0.63 MG: 0.63 SOLUTION RESPIRATORY (INHALATION) at 19:59

## 2018-01-01 RX ADMIN — LORAZEPAM 0.5 MG: 2 INJECTION INTRAMUSCULAR; INTRAVENOUS at 17:20

## 2018-01-01 RX ADMIN — SODIUM CHLORIDE 500 ML: 9 INJECTION, SOLUTION INTRAVENOUS at 02:27

## 2018-01-01 RX ADMIN — METOPROLOL TARTRATE 25 MG: 25 TABLET ORAL at 20:04

## 2018-01-01 RX ADMIN — GLYCOPYRROLATE 0.2 MG: 0.2 INJECTION, SOLUTION INTRAMUSCULAR; INTRAVENOUS at 20:43

## 2018-01-01 ASSESSMENT — PAIN SCALES - GENERAL
PAINLEVEL_OUTOF10: 0
PAINLEVEL_OUTOF10: 4
PAINLEVEL_OUTOF10: 0

## 2018-01-01 ASSESSMENT — PAIN DESCRIPTION - LOCATION
LOCATION: OTHER (COMMENT)
LOCATION: GENERALIZED

## 2018-01-01 ASSESSMENT — PAIN DESCRIPTION - PROGRESSION: CLINICAL_PROGRESSION: GRADUALLY WORSENING

## 2018-01-01 ASSESSMENT — PAIN DESCRIPTION - ONSET: ONSET: UNABLE TO TELL

## 2018-01-01 ASSESSMENT — PAIN DESCRIPTION - DESCRIPTORS: DESCRIPTORS: PATIENT UNABLE TO DESCRIBE

## 2018-01-01 ASSESSMENT — PAIN DESCRIPTION - PAIN TYPE: TYPE: ACUTE PAIN

## 2018-01-01 ASSESSMENT — PAIN DESCRIPTION - FREQUENCY: FREQUENCY: INTERMITTENT

## 2018-01-01 ASSESSMENT — PAIN SCALES - WONG BAKER: WONGBAKER_NUMERICALRESPONSE: 4

## 2018-09-10 PROBLEM — G93.1 ANOXIC ENCEPHALOPATHY (HCC): Status: ACTIVE | Noted: 2018-01-01

## 2018-09-10 PROBLEM — R13.10 SWALLOWING DYSFUNCTION: Status: ACTIVE | Noted: 2018-01-01

## 2018-09-10 PROBLEM — G81.91 HEMIPARESIS OF RIGHT DOMINANT SIDE (HCC): Status: ACTIVE | Noted: 2018-01-01

## 2018-09-10 PROBLEM — I63.50 CEREBRAL ARTERY OCCLUSION WITH CEREBRAL INFARCTION (HCC): Status: ACTIVE | Noted: 2018-01-01

## 2018-09-10 PROBLEM — J18.9 PNEUMONIA: Status: ACTIVE | Noted: 2018-01-01

## 2018-09-10 NOTE — ED PROVIDER NOTES
80-year-old male presents for evaluation of altered mental status, fever, PEG tube problem. Family reports today he had a fever of 100. They report he normally makes eye contact and today he was not, so they called his family doctor who told them to bring him here. He reports he's had problems with cough and congestion for many months that was being improved by Mucinex. They report feeling doctor started him on Keflex last week for possible infection around his PEG tube site. Family reports that they've had issues flush and the PEG tube for the past 2 weeks. 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 At patient's baseline he is nonverbal, he does not follow commands. 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 The history is provided by a caregiver and a relative. History limited by: nonverbal.       Review of Systems   Unable to perform ROS: Patient nonverbal       Physical Exam   Constitutional: He appears well-developed and well-nourished. HENT:   Head: Normocephalic and atraumatic. Right Ear: External ear normal.   Left Ear: Tympanic membrane, external ear and ear canal normal.   R TM cerumen occuded   Eyes: Conjunctivae are normal.   Neck: Normal range of motion. Neck supple. Cardiovascular: Normal rate, regular rhythm and normal heart sounds. No murmur heard. Pulmonary/Chest: Effort normal and breath sounds normal. No respiratory distress. He has no wheezes. He has no rales. Abdominal: Bowel sounds are normal. He exhibits distension. Non tympanic. PEG tube in place. Granulation tissue to site, other wise clean, dry, intact. Musculoskeletal: He exhibits no edema, tenderness or deformity. Neurological: He is alert. Increased tone left side, minimal tone R side. Eyes open, occasionally seems to make eye contact. Non-verbal, intermittently moaning, chewing on stuffed animal.   Skin: Skin is warm and dry. Nursing note and vitals reviewed.       Procedures    MDM    ED Normal    ------------------------------------------ PROGRESS NOTES ------------------------------------------  Re-evaluation(s):  Time: 1500  Patients symptoms show no change  Repeat physical examination is not changed    Counseling:  I have spoken with the patient and discussed todays results, in addition to providing specific details for the plan of care and counseling regarding the diagnosis and prognosis. Their questions are answered at this time and they are agreeable with the plan of admission.    --------------------------------- ADDITIONAL PROVIDER NOTES ---------------------------------  Consultations: Spoke with Dr. Tommie Herrera.  Discussed case. They will admit the patient. This patient's ED course included: a personal history and physicial examination, multiple bedside re-evaluations, IV medications, cardiac monitoring and continuous pulse oximetry    This patient has remained hemodynamically stable during their ED course. Diagnosis:  1. Community acquired pneumonia, unspecified laterality        Disposition:  Patient's disposition: Admit to med/surg floor  Patient's condition is stable.            Sandy Millan DO  Resident  09/10/18 0972

## 2018-09-10 NOTE — PROGRESS NOTES
Dr. Rolly Card aware of consult, will see in AM. Electronically signed by Dorcas Zelaya RN on 9/10/2018 at 5:31 PM

## 2018-09-11 PROBLEM — R79.89 PRERENAL AZOTEMIA: Status: ACTIVE | Noted: 2018-01-01

## 2018-09-11 NOTE — CONSULTS
reconstruction. Lack of IV contrast limits the interpretation and the sensitivity of the exam in the evaluation of solid organs and vasculature, including but not limited to lack of detection of underlying mass or malignancy and/or any other potential abnormalities. Left basilar infiltrate/pneumonia and small left pleural effusion. Suspected posteriorly dependent atelectasis in the right lung base. No discrete noncalcified pulmonary nodule or spiculated mass definitively identified. Vascular calcifications left circumflex and right coronary arteries as well as within the thoracic and abdominal aorta. Visualized portions of the esophagus, stomach, duodenum, pancreas, pancreatic duct, common bile duct, gallbladder, spleen, adrenals, are unremarkable. Hypodensity throughout the right liver lobe, possibly reflective of beam hardening artifact/artifact from improper patient positioning versus a potential geographic fatty infiltration or mass, measured at approximately 10.7 cm anterior posterior by 10.7 cm transverse on series 2 image 17. Hypodensity projecting from the right kidney demonstrating fluid on Hounsfield unit evaluation, measuring approximately 1.5 x 1.7 cm. Hypodensity involving the left kidney demonstrating fluid on Hounsfield unit evaluation, measuring approximately 1.3 x 1.3 cm. Calcific density overlying the left kidney measuring approximately 0.26 cm. Another hypodensity projects exophytically from the left kidney on series 2 image 29, measuring approximately 0.6 cm. The appendix is not identified and therefore not evaluated. Bladder is unremarkable. Prostatic calcifications. Mild-to-moderate degenerative changes bilateral hips with convex right spinal curvature centered about L1-L2 vertebral body and moderate degenerative disc disease L1-S1. No definitive lytic or blastic bony lesions. Mild to moderate distal descending and sigmoid colon diverticulosis.  No evidence of bowel obstruction or dilated loops of small bowel. There is an area of luminal narrowing noted in the proximal descending colon best seen on coronal imaging series 601, images 79 through 69. Visualized portions of the gastrohepatic ligamentous region, periportal/pericaval region, periaortic, and iliac chain regions. Abnormally enlarged left inguinal lymphadenopathy measuring approximately 1.1 x 1.5 cm with another prominent but not pathologically enlarged by CT size criteria left-sided inguinal lymph nodes. Abnormally enlarged right inguinal lymphadenopathy measuring approximately 1.0 x 1.0 cm and approximately 1.3 x 1.0 cm. ALERT:  THIS IS AN ABNORMAL REPORT 1. Suspected left basilar infiltrate/pneumonia left pleural effusion. 2. There is an area of luminal narrowing and stenosis involving the proximal left descending colon beginning at/just distal to the hepatic flexure, this finding could reflect peristalsis, however, I cannot exclude a circumferential infiltrating malignancy. Gastrointestinal specialist consultation and direct examination and evaluation is recommended. 3. Mild to moderate distal descending and sigmoid colon diverticulosis. 4. Abnormally enlarged left inguinal lymphadenopathy and right inguinal lymphadenopathy as above. Differential diagnosis includes but is not limited to infection, inflammatory/reactive lymphadenopathy, metastatic disease/malignancy versus less likely lymphoma or lymphoproliferative disorder. 5. Abnormal appearance of the right liver lobe with a hypodense appearance noted measured at approximately 10.7 x 10.7 cm, this finding could be reflective of beam hardening/shadowing from improper patient positioning, I cannot exclude the possibility of geographic fatty infiltration or mass based on this exam. Repeat exam with proper patient positioning is suggested if clinically warranted. 6. Vascular calcifications left circumflex and right coronary arteries as and the thoracic and abdominal aorta.  7. 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 8. Nonobstructing left renal calculus with suspected bilateral renal cyst.    Ct Head Wo Contrast    Result Date: 9/10/2018  Patient MRN:  [id number] : 1934 Age: 80 years Gender: Male EXAM: CT HEAD WO CONTRAST INDICATION: Altered mental status history of hyperlipidemia COMPARISON: None TECHNIQUE: Axial  CT scanning was performed through the head without the use of intravenous contrast. Low-dose CT  acquisition technique included one of following options: 1 . Automated exposure control 2. Adjustment of MA and or KV according to patient's size or 3. Use of iterative reconstruction. FINDINGS: No evidence of acute intracranial hemorrhage, midline shift, or mass effect. No abnormal extra-axial fluid collections. Age-appropriate prominence of the ventricles and sulci. Moderate periventricular and subcortical white matter hypodensity is nonspecific but probably related to chronic small vessel ischemic disease. 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 Basilar cisterns are patent. There are intracranial atherosclerotic calcifications. No depressed calvarial fracture. 1. No acute intracranial hemorrhage, midline shift, or mass effect. 2. Moderate Chronic small vessel ischemic disease. Please note MRI is more sensitive for detection of acute ischemia. Xr Chest Portable    Result Date: 9/10/2018  Patient MRN: [id number] : 1934 Age:  80 years Gender: Male Order Date: 9/10/2018 12:30 PM Exam: XR CHEST PORTABLE Number of Views: 1 Indication:   Altered mental status, rhonchi Comparison: None Findings:  There is a enlarged cardiomediastinal silhouette with suspected underlying pulmonary edema with thoracic aortic vascular calcifications and bibasilar airspace disease left hemidiaphragmatic elevation. Annie Beckman No pneumothorax. ALERT:  THIS IS AN ABNORMAL REPORT 1. Enlarged cardiomediastinal silhouette, the possibility of underlying pericardial effusion or other cardiac abnormalities is not excluded on the basis of this exam, clinical correlation recommended. . 2. Suspected underlying mild pulmonary edema. 3. Vascular calcifications thoracic aorta. 4. T and nonionic elements. 5. Suspected bibasilar atelectasis versus developing infiltrate/pneumonia, clinical correlation for infectious brain is recommended        ASSESSMENT:  80 y.o. male with pneumonia, question of PEG dysfunction    PLAN:  Questionable colon thickening on CT scan. Will request colonoscopy records from [facility]. PEG tube appears in good position on CT and site does not appear infected on exam  If PEG does not flush may need replaced at bedside.     Electronically signed by Laverne Ontiveros MD on 9/11/18 at 3:44 PM

## 2018-09-11 NOTE — CARE COORDINATION
Social Work / Discharge Planning : SW noted consult for discharge planning. SW and CM met with patient daughter at length and explained role. Patient daughter provided an extensive Hx. Family has been caring for patient for over a yea after patient suffered a CVA. Patient is non-verbal., not tracking SW with eyes and totally dependent on family for all aspects of care. Katy stays with patient and is primary caregiver along with assist from other siblings and grandchildren. . Patient has hospital bed, catherine lift, nebulizer and he enjoys his recliner throughout the day. Patient has his TF and supplies through [address] [place] [address] with no issues voiced. Patient has a past hx of [facility] and [facility]. Plan at discharge is home. If HHC is needed, daughter will discuss choices with SW that is contacted with patient Jonah. Additional services were discussed at length and  family has explored options available. Unfortunately, patient does not financially qualify for community based and [facility] based services. Family denies any home needs at this time and SW to follow.    Electronically signed by ANTWAN Betts on 9/11/2018 at 1:28 PM

## 2018-09-11 NOTE — PLAN OF CARE
Problem: Falls - Risk of:  Goal: Will remain free from falls  Will remain free from falls   Outcome: Met This Shift      Problem: Risk for Impaired Skin Integrity  Goal: Tissue integrity - skin and mucous membranes  Structural intactness and normal physiological function of skin and  mucous membranes.    Outcome: Met This Shift      Problem: Pain:  Goal: Pain level will decrease  Pain level will decrease     Outcome: Met This Shift      Problem: Aspiration - Risk of  Goal: Absence of aspiration  Absence of aspiration     Outcome: Met This Shift

## 2018-09-11 NOTE — PROGRESS NOTES
Occupational Therapy    OT consult was received and according to chart pt had a CVA in 2017 and since then pt has had rehab which was unsuccessful. At baseline pt is non-verbal, does not follow commands, is hemiparetic on R side, lives at home and has 24 hour caregivers provided by his daughters. He is dependent for ADLs, utilizes PEG tube for nutrition and caregivers use a catherine lift to transfer pt. According to this information, pt is at his functional baseline and has no skilled OT needs at this time. Will discharge pt from our service.      Lulú Barrett OTR/L  License [id number]

## 2018-09-11 NOTE — CARE COORDINATION
Introduced my self and provided explanation of CM role to patient's daughter who is bedside. Patient's daughter is aware of current diagnosis and treatment plan including IV atb, IV hydration, and aerosol treatments. Daughter verbalizes that patient has been at home with her for about 1 year. She, in conjunction with her sister and brother, provide 24 hr care to the patient who is non-verbal and dependent. She has a mechanical lift and a hospital bed. Patient is not active with any [facility] at this time. He receives his enteral feeding supplies from [employer] on a monthly basis. Patient also does have a nebulizer pump. Patient's daughter confirms pcp makes home visits periodically. Plan would be return to same environment on discharge. ELOS communicated at 3-4 days pending clinical progression/surgical treatment plan. Patient's daughter verbalizes no concerns and identifies no areas to focus on nor barriers to discharge. Will follow along with  and assist with discharge planning as necessary. Gavino Leslie, MSN, RN  [facility] Case Management  [phone]

## 2018-09-11 NOTE — PROGRESS NOTES
Physical Therapy    Facility/Department: [address] MED SURG     NAME: Taylor Wasserman  : 1934  MRN: [id number]    Date of Service: 2018    PT consult was received and according to chart pt had a CVA in 2017 and since then pt has had rehab which was unsuccessful. At baseline pt is non-verbal, does not follow commands, is hemiparetic on R side, lives at home and has 24 hour caregivers provided by his daughters. He is dependent for ADLs and caregivers use a catherine lift to transfer pt. According to this information, pt is at his functional baseline and has no skilled PT needs at this time. Will discharge pt from our service. Chanel Mccrarys., P.T.   License Number: [id number]

## 2018-09-11 NOTE — H&P
Eosinophils # 0.66 (H) 0.05 - 0.50 E9/L    Basophils # 0.05 0.00 - 0.20 E9/L   Lactic Acid, Plasma    Collection Time: 09/10/18 12:59 PM   Result Value Ref Range    Lactic Acid 1.4 0.5 - 2.2 mmol/L   Urinalysis with Microscopic    Collection Time: 09/10/18  2:12 PM   Result Value Ref Range    Color, UA Yellow Straw/Yellow    Clarity, UA Clear Clear    Glucose, Ur Negative Negative mg/dL    Bilirubin Urine Negative Negative    Ketones, Urine Negative Negative mg/dL    Specific Gravity, UA <=1.005 1.005 - 1.030    Blood, Urine Negative Negative    pH, UA 6.5 5.0 - 9.0    Protein, UA Negative Negative mg/dL    Urobilinogen, Urine 0.2 <2.0 E.U./dL    Nitrite, Urine Negative Negative    Leukocyte Esterase, Urine Negative Negative    WBC, UA 0-1 0 - 5 /HPF    RBC, UA 0-1 0 - 2 /HPF    Bacteria, UA NONE /HPF   Comprehensive metabolic panel    Collection Time: 09/10/18  4:00 PM   Result Value Ref Range    Sodium 136 132 - 146 mmol/L    Potassium 5.0 3.5 - 5.0 mmol/L    Chloride 100 98 - 107 mmol/L    CO2 24 22 - 29 mmol/L    Anion Gap 12 7 - 16 mmol/L    Glucose 86 74 - 109 mg/dL    BUN 28 (H) 8 - 23 mg/dL    CREATININE 1.4 (H) 0.7 - 1.2 mg/dL    GFR Non-African American 48 >=60 mL/min/1.73    GFR African American 58     Calcium 9.4 8.6 - 10.2 mg/dL    Total Protein 8.1 6.4 - 8.3 g/dL    Alb 3.9 3.5 - 5.2 g/dL    Total Bilirubin 0.3 0.0 - 1.2 mg/dL    Alkaline Phosphatase 145 (H) 40 - 129 U/L    ALT 30 0 - 40 U/L    AST 25 0 - 39 U/L   CBC    Collection Time: 09/11/18  3:55 AM   Result Value Ref Range    WBC 11.2 4.5 - 11.5 E9/L    RBC 4.26 3.80 - 5.80 E12/L    Hemoglobin 13.4 12.5 - 16.5 g/dL    Hematocrit 39.5 37.0 - 54.0 %    MCV 92.7 80.0 - 99.9 fL    MCH 31.5 26.0 - 35.0 pg    MCHC 33.9 32.0 - 34.5 %    RDW 14.2 11.5 - 15.0 fL    Platelets 122 301 - 962 E9/L    MPV 10.8 7.0 - 12.0 fL   Basic Metabolic Panel    Collection Time: 09/11/18  3:55 AM   Result Value Ref Range    Sodium 137 132 - 146 mmol/L    Potassium 5.1

## 2018-09-12 PROBLEM — E44.0 MODERATE PROTEIN-CALORIE MALNUTRITION (HCC): Chronic | Status: ACTIVE | Noted: 2018-01-01

## 2018-09-12 NOTE — PROGRESS NOTES
GENERAL SURGERY  DAILY PROGRESS NOTE  9/12/2018    Subjective:  No acute issues overnight.  For replacement PEG today    Objective:  BP (!) 148/75   Pulse 85   Temp 99 °F (37.2 °C) (Axillary)   Resp 16   Ht 5' 6\" (1.676 m)   Wt 178 lb (80.7 kg)   SpO2 94%   BMI 28.73 kg/m²     General appearance: laying in bed, non-verbal  Lungs: breath sounds present bilaterally, non-labored respiraitons  Heart: regular rate  Abdomen: soft, nondistended, nontender, PEG in place    Assessment/Plan:  80 y.o. male malfunctioning PEG, placed over a year ago    NPO for now  IVFs  Replacement PEG today  Consent with POA    Note signed electronically by Nabila Hurt M.D. at 7:31 AM on 9/12/2018

## 2018-09-12 NOTE — PATIENT CARE CONFERENCE
[facility] Quality Flow/Interdisciplinary Rounds Progress Note        Quality Flow Rounds held on September 12, 2018    Disciplines Attending:  Bedside Nurse, ,  and Nursing Unit Leadership    Tameka Sheldon was admitted on 9/10/2018 11:56 AM    Anticipated Discharge Date:  Expected Discharge Date: 09/13/18    Disposition:    Jose Elias Score:  Jose Elias Scale Score: 12    Readmission Risk              Risk of Unplanned Readmission:        10             Discussed patient goal for the day, patient clinical progression, and barriers to discharge.   The following Goal(s) of the Day/Commitment(s) have been identified:  Endo today with peg      Zeferino Getting  September 12, 2018

## 2018-09-12 NOTE — PROGRESS NOTES
Phone consent given for pt by Justin Navarrete, for EGD with gastrostomy tube placement and administration of blood. Consents in chart.

## 2018-09-12 NOTE — PROGRESS NOTES
Internal Medicine  Progress Note  Leisa Mcghee MD     Subjective:     Patient is awake and moves around but no verbal communication. Scheduled Meds:   ceFAZolin  2 g Intravenous See Admin Instructions    aspirin  81 mg PEG Tube QAM    atorvastatin  40 mg PEG Tube Nightly    clopidogrel  75 mg PEG Tube QAM    losartan  50 mg PEG Tube QAM    metoprolol tartrate  25 mg PEG Tube BID    traMADol  50 mg PEG Tube BID    enoxaparin  40 mg Subcutaneous Daily    cefTRIAXone (ROCEPHIN) 1 g in 10 mL IV syringe  1 g Intravenous Q24H    albuterol  0.63 mg Nebulization Q6H     Continuous Infusions:   sodium chloride 80 mL/hr at 09/12/18 0612     PRN Meds:acetaminophen    Objective:      Physical Exam:  Vitals:   BP (!) 148/75   Pulse 85   Temp 99 °F (37.2 °C) (Axillary)   Resp 16   Ht 5' 6\" (1.676 m)   Wt 178 lb (80.7 kg)   SpO2 94%   BMI 28.73 kg/m²   I/O's    Intake/Output Summary (Last 24 hours) at 09/12/18 0631  Last data filed at 09/11/18 1811   Gross per 24 hour   Intake             1132 ml   Output                0 ml   Net             1132 ml     Exam:  General appearance: alert, appears stated age and no distress  Head: Normocephalic, without obvious abnormality, atraumatic  Eyes: negative  Throat: normal findings: lips normal without lesions  Neck: no adenopathy, no carotid bruit, no JVD and supple, symmetrical, trachea midline  Back: negative  Lungs: clear to auscultation bilaterally  Chest wall: no tenderness  Heart: regular rate and rhythm  Abdomen: soft, non-tender; bowel sounds normal; no masses,  no organomegaly  Extremities: extremities normal, atraumatic, no cyanosis or edema  Pulses: 2+ and symmetric  Skin: Skin color, texture, turgor normal. No rashes or lesions  Lymph nodes: Cervical, supraclavicular, and axillary nodes normal.  Neurologic: no verbal comm - but appears at baseline      Imaging     Chest  Xray:  No results found for this or any previous visit.   Results for orders

## 2018-09-12 NOTE — OP NOTE
Endoscopic  Procedure Note    Procedure: Esophagogastroduodenoscopy with placement of a percutaneous endoscopic gastrostomy tube    Pre-operative Diagnosis: PEG malfunction    Post-operative Diagnosis: Same    Sedation: MAC        Complications: None            Disposition: PACU - hemodynamically stable.            Condition: stable        Brook Desai 9/12/2018 3:17 PM 2

## 2018-09-12 NOTE — ANESTHESIA PRE PROCEDURE
Department of Anesthesiology  Preprocedure Note       Name:  Cecelia Mares   Age:  80 y.o.  :  1934                                          MRN:  [id number]         Date:  2018      Surgeon: Aurora Saunders):  Tonia Mercado MD    Procedure: Procedure(s):  EGD PEG TUBE PLACEMENT    Medications prior to admission:   Prior to Admission medications    Medication Sig Start Date End Date Taking? Authorizing Provider   Lactobacillus (ACIDOPHILUS PO) 1 capsule by PEG Tube route every morning    Yes Historical Provider, MD   losartan (COZAAR) 50 MG tablet 50 mg by PEG Tube route every morning    Yes Historical Provider, MD   clopidogrel (PLAVIX) 75 MG tablet 75 mg by PEG Tube route every morning    Yes Historical Provider, MD   aspirin 81 MG chewable tablet 81 mg by PEG Tube route every morning    Yes Historical Provider, MD   traMADol (ULTRAM) 50 MG tablet 50 mg by PEG Tube route 2 times daily. .   Yes Historical Provider, MD   metoprolol tartrate (LOPRESSOR) 25 MG tablet 25 mg by PEG Tube route 2 times daily   Yes Historical Provider, MD   atorvastatin (LIPITOR) 40 MG tablet 40 mg by PEG Tube route nightly    Yes Historical Provider, MD   Nutritional Supplements (NUTREN 2.0 PO) 50 mL/hr by PEG Tube route nightly   Yes Historical Provider, MD   NONFORMULARY 120 mL/hr by PEG Tube route every hour   Yes Historical Provider, MD   acetaminophen (TYLENOL) 500 MG tablet 500 mg by PEG Tube route every 6 hours as needed for Pain   Yes Historical Provider, MD       Current medications:    Current Facility-Administered Medications   Medication Dose Route Frequency Provider Last Rate Last Dose    ceFAZolin (ANCEF) 2 g in dextrose 5 % 100 mL IVPB  2 g Intravenous See Admin Instructions Sonja Granados MD        acetaminophen (TYLENOL) 160 MG/5ML solution 500 mg  500 mg PEG Tube Q6H PRN Syd Sweeney MD        aspirin chewable tablet 81 mg  81 mg PEG Tube QAM Syd Sweeney MD   Stopped at 18 0744    atorvastatin (LIPITOR) tablet 40 mg  40 mg PEG Tube Nightly Hadley Arriaza MD   40 mg at 09/11/18 2152    clopidogrel (PLAVIX) tablet 75 mg  75 mg PEG Tube JANIYA Arriaza MD   Stopped at 09/12/18 0744    losartan (COZAAR) tablet 50 mg  50 mg PEG Tube JANIYA Arriaza MD   50 mg at 09/12/18 0743    metoprolol tartrate (LOPRESSOR) tablet 25 mg  25 mg PEG Tube BID Hadley Arriaza MD   25 mg at 09/12/18 0743    traMADol (ULTRAM) tablet 50 mg  50 mg PEG Tube BID Hadley Arriaza MD   50 mg at 09/12/18 0743    enoxaparin (LOVENOX) injection 40 mg  40 mg Subcutaneous Daily Hadley Arriaza MD   40 mg at 09/11/18 1701    cefTRIAXone (ROCEPHIN) 1 g in sterile water 10 mL IV syringe  1 g Intravenous Q24H Hadley Arriaza MD   1 g at 09/11/18 1543    0.9 % sodium chloride infusion   Intravenous Continuous Stefanie Cruz MD 80 mL/hr at 09/12/18 0612      albuterol (ACCUNEB) nebulizer solution 0.63 mg  0.63 mg Nebulization Q6H Stefanie Cruz MD   0.63 mg at 09/12/18 0940       Allergies:     Allergies   Allergen Reactions    Nitroglycerin     Tetanus Toxoids        Problem List:    Patient Active Problem List   Diagnosis Code    Pneumonia J18.9    Anoxic encephalopathy (HCC) G93.1    Hemiparesis of right dominant side ([address] G81.91    Cerebral artery occlusion with cerebral infarction ([address] I63.50    Swallowing dysfunction R13.10    Prerenal azotemia R79.89    Moderate protein-calorie malnutrition (HCC) E44.0       Past Medical History:        Diagnosis Date    Cerebral artery occlusion with cerebral infarction ([address]     Hyperlipidemia     MI (myocardial infarction) ([address]        Past Surgical History:        Procedure Laterality Date    CATARACT REMOVAL      FRACTURE SURGERY         Social History:    Social History   Substance Use Topics    Smoking status: Former Smoker     Types: Cigars    Smokeless tobacco: Former User     Types: Chew     Quit date: 9/10/2000    Alcohol use No                                Counseling given: Not

## 2018-09-13 NOTE — ANESTHESIA POSTPROCEDURE EVALUATION
Department of Anesthesiology  Postprocedure Note    Patient: Dariel Reilly  MRN: [id number]  YOB: 1934  Date of evaluation: 9/13/2018  Time:  12:28 AM     Procedure Summary     Date:  09/12/18 Room / Location:  [place] [address] / [facility]    Anesthesia Start:  1426 Anesthesia Stop:  9771    Procedure:  EGD PEG TUBE PLACEMENT (N/A ) Diagnosis:  (UNKNOWN)    Surgeon:  Baljinder Day MD Responsible Provider:  Lady Chip MD    Anesthesia Type:  MAC ASA Status:  4          Anesthesia Type: MAC    Shaquille Phase I:      Shaquille Phase II: Shaquille Score: 7    Last vitals: Reviewed and per EMR flowsheets.        Anesthesia Post Evaluation    Patient location during evaluation: PACU  Patient participation: complete - patient cannot participate  Level of consciousness: obtunded/minimal responses  Airway patency: patent  Nausea & Vomiting: no vomiting and no nausea  Complications: no  Cardiovascular status: hemodynamically stable  Respiratory status: acceptable  Hydration status: stable

## 2018-09-13 NOTE — OP NOTE
[id number] [address]                                 OPERATIVE REPORT    PATIENT NAME: Vero Oliver                   :        1934  MED REC NO:   [id number]                            ROOM:       70  ACCOUNT NO:   [de-identified]                           ADMIT DATE: 09/10/2018  PROVIDER:     Derrick Mcclure MD    DATE OF PROCEDURE:  2018    PREOPERATIVE DIAGNOSIS:  Malfunctioning PEG tube. POSTOPERATIVE DIAGNOSIS:  Malfunctioning PEG tube. PROCEDURE PERFORMED:  Esophagogastroduodenoscopy with PEG replacement. SURGEON:  Derrick Mcclure MD    ANESTHESIA:  MAC. DESCRIPTION OF PROCEDURE:  The patient was brought to the endoscopy suite  and placed on the table in the supine position. The patient received  anesthesia per the Department of Anesthesiology. The previous PEG tube was  cut in half. A wire was placed through the PEG tube. The endoscope was  used to grasp the wire and pulled through the mouth. The PEG tube was  reattached. The PEG was pulled through the abdominal wall and secured with  the appropriate bolster. The endoscope was reinserted, and placement was  verified.         Machelle Harry MD    D: 2018 15:35:42       T: 2018 19:57:11     [id number]  Job#: [id number]     Doc#: [id number]    CC:  MD Derrick Luke MD

## 2018-11-30 PROBLEM — G93.1 ANOXIC ENCEPHALOPATHY (HCC): Chronic | Status: ACTIVE | Noted: 2018-01-01

## 2018-11-30 PROBLEM — G81.91 HEMIPARESIS OF RIGHT DOMINANT SIDE (HCC): Chronic | Status: ACTIVE | Noted: 2018-01-01

## 2018-11-30 PROBLEM — R13.12 OROPHARYNGEAL DYSPHAGIA: Chronic | Status: ACTIVE | Noted: 2018-01-01

## 2018-11-30 PROBLEM — J69.0 ASPIRATION PNEUMONIA (HCC): Status: ACTIVE | Noted: 2018-01-01

## 2018-11-30 PROBLEM — J96.00 ACUTE RESPIRATORY FAILURE (HCC): Status: ACTIVE | Noted: 2018-01-01

## 2018-11-30 PROBLEM — N17.9 AKI (ACUTE KIDNEY INJURY) (HCC): Status: ACTIVE | Noted: 2018-01-01

## 2018-11-30 PROBLEM — Z86.73 HISTORY OF COMPLETED STROKE: Chronic | Status: ACTIVE | Noted: 2018-01-01

## 2018-11-30 NOTE — PROGRESS NOTES
Admitted to room 8506B via stretcher. Bipap on. RT at the bedside. Family assisted to waiting area. Transferred from stretcher to bed x 5 assist. Pt has eyes closed and tachypneic. Noticed blood coming from the mouth. Suctioned orally. Pulse ox at 99%.

## 2018-11-30 NOTE — ED PROVIDER NOTES
toxoids    ----------------------------------------------PHYSICAL EXAM--------------------------------------  Constitutional/General: Non-verbal which is his baseline, ill appearing. Head: Normocephalic and atraumatic  Eyes: PERRL, EOMI, conjunctiva normal, sclera non icteric  Mouth: Oropharynx clear, handling secretions, no trismus, no asymmetry of the posterior oropharynx or uvular edema  Neck: Supple, no JVD, full ROM, no crepitus and no meningeal signs  Respiratory: Tachypneic, in respiratory distress, severe rhonchi to auscultation bilaterally, no wheezes or rales. Cardiovascular:  Regular rate. Regular rhythm. No murmurs, gallops, or rubs. 2+ distal pulses  Chest: No chest wall tenderness  GI:  Abdomen is distended, peg tube in place. +BS. No rebound, guarding, or rigidity. No pulsatile masses. Musculoskeletal: 1+ edema to BLE. Moves all extremities x 4. Warm, no clubbing or cyanosis. Integument: Skin warm and dry. No rashes. Neurologic: no focal deficits,   Psychiatric: Normal Affect    -------------------------------------------------- RESULTS -------------------------------------------------  I have personally reviewed all laboratory and imaging results for this patient. Results are listed below.      LABS:  Results for orders placed or performed during the hospital encounter of 11/29/18   Respiratory Panel, Film Array   Result Value Ref Range    Film Array Adenovirus       Result: Not Detected  *  *  Normal Range: Not Detected      Film Array Coronavirus HKU1       Result: Not Detected  *  *  Normal Range: Not Detected      Film Array Conoravirus NL63       Result: Not Detected  *  *  Normal Range: Not Detected      Film Array Coronavirus 229E       Result: Not Detected  *  *  Normal Range: Not Detected      Film Array Coronavirus OC43       Result: Not Detected  *  *  Normal Range: Not Detected      Film Array Influenza A Virus       Result: Not Detected  *  *  Normal Range: Not Detected      Film Array Influenza A Virus H1       Result: Not Detected  *  *  Normal Range: Not Detected      Film Array Influenza A Virus 09H1       Result: Not Detected  *  *  Normal Range: Not Detected      Film Array Influenza A Virus H3       Result: Not Detected  *  *  Normal Range: Not Detected      Film Array Influenza B       Result: Not Detected  *  *  Normal Range: Not Detected      Film Array Metapneumovirus       Result: Not Detected  *  *  Normal Range: Not Detected      Film Array Parainfluenza Virus 1       Result: Not Detected  *  *  Normal Range: Not Detected      Film Array Parainfluenza Virus 2       Result: Not Detected  *  *  Normal Range: Not Detected      Film Array Parainfluenza Virus 3       Result: Not Detected  *  *  Normal Range: Not Detected      Film Array Parainfluenza Virus 4       Result: Not Detected  *  *  Normal Range: Not Detected      Film Array Respiratory Syncitial Virus       Result: Not Detected  *  *  Normal Range: Not Detected      Film Array Rhinovirus/Enterovirus       Result: Not Detected  *  *  Normal Range: Not Detected      Film Array Bordetella Pertusis       Result: Not Detected  *  *  Normal Range: Not Detected      Film Array Chlamydophilia Pneumoniae       Result: Not Detected  *  *  Normal Range: Not Detected      Film Array Mycoplasma Pneumoniae       Result: Not Detected  *  *  Normal Range: Not Detected     CBC Auto Differential   Result Value Ref Range    WBC 23.7 (H) 4.5 - 11.5 E9/L    RBC 5.19 3.80 - 5.80 E12/L    Hemoglobin 15.9 12.5 - 16.5 g/dL    Hematocrit 48.3 37.0 - 54.0 %    MCV 93.1 80.0 - 99.9 fL    MCH 30.6 26.0 - 35.0 pg    MCHC 32.9 32.0 - 34.5 %    RDW 14.3 11.5 - 15.0 fL    Platelets 252 509 - 529 E9/L    MPV 10.9 7.0 - 12.0 fL    Neutrophils % 89.6 (H) 43.0 - 80.0 %    Lymphocytes % 5.2 (L) 20.0 - 42.0 %    Monocytes % 2.6 2.0 - 12.0 %    Eosinophils % 0.4 0.0 - 6.0 %    Basophils % 0.3 0.0 - 2.0 %    Neutrophils # 21.57 (H) 1.80 - 7.30 E9/L    Lymphocytes # 1.42 (L) 1.50 - 4.00 E9/L    Monocytes # 0.71 0.10 - 0.95 E9/L    Eosinophils # 0.00 (L) 0.05 - 0.50 E9/L    Basophils # 0.00 0.00 - 0.20 E9/L    Atypical Lymphocytes Relative 0.9 0.0 - 4.0 %    Metamyelocytes Relative 1.7 (H) 0.0 - 1.0 %    Polychromasia 1+     Cole Cells 1+    Urinalysis   Result Value Ref Range    Color, UA Yellow Straw/Yellow    Clarity, UA Clear Clear    Glucose, Ur Negative Negative mg/dL    Bilirubin Urine Negative Negative    Ketones, Urine TRACE (A) Negative mg/dL    Specific Gravity, UA 1.025 1.005 - 1.030    Blood, Urine LARGE (A) Negative    pH, UA 5.0 5.0 - 9.0    Protein,  (A) Negative mg/dL    Urobilinogen, Urine 0.2 <2.0 E.U./dL    Nitrite, Urine Negative Negative    Leukocyte Esterase, Urine Negative Negative   Blood Gas, Arterial   Result Value Ref Range    Date Analyzed [id number]     Time Analyzed 0104     Source: Blood Arterial     pH, Blood Gas 7.388 7.350 - 7.450    PCO2 32.7 (L) 35.0 - 45.0 mmHg    PO2 286.6 (H) 60.0 - 100.0 mmHg    HCO3 19.3 (L) 22.0 - 26.0 mmol/L    B.E. -4.6 (L) -3.0 - 3.0 mmol/L    O2 Sat 99.6 (H) 92.0 - 98.5 %    PO2/FIO2 2.87 mmHg/%    AaDO2 368.7 mmHg    RI(T) 129 %    O2Hb 98.9 (H) 94.0 - 97.0 %    COHb 0.5 0.0 - 1.5 %    MetHb 0.2 0.0 - 1.5 %    O2 Content 22.6 mL/dL    HHb 0.4 0.0 - 5.0 %    tHb (est) 15.8 11.5 - 16.5 g/dL    Mode BIPAP     FIO2 100.0 %    Peep/Cpap 5.0 cmH2O    PIP 15.0 cmH2O    Date Of Collection      Time Collected      Pt Temp 37.0 C     ID [id number]     Lab [id number]     Critical(s) Notified .  No Critical Values    Lactic acid, plasma   Result Value Ref Range    Lactic Acid 2.3 (H) 0.5 - 2.2 mmol/L   Troponin   Result Value Ref Range    Troponin 0.46 (H) 0.00 - 0.03 ng/mL   Comprehensive metabolic panel   Result Value Ref Range    Sodium 137 132 - 146 mmol/L    Potassium 4.6 3.5 - 5.0 mmol/L    Chloride 102 98 - 107 mmol/L    CO2 18 (L) 22 - 29 mmol/L    Anion Gap 17 (H) 7 - 16 mmol/L    Glucose 124 (H) 74 - 99 mg/dL BUN 36 (H) 8 - 23 mg/dL    CREATININE 1.7 (H) 0.7 - 1.2 mg/dL    GFR Non-African American 39 >=60 mL/min/1.73    GFR African American 47     Calcium 9.3 8.6 - 10.2 mg/dL    Total Protein 7.8 6.4 - 8.3 g/dL    Alb 3.6 3.5 - 5.2 g/dL    Total Bilirubin 0.4 0.0 - 1.2 mg/dL    Alkaline Phosphatase 128 40 - 129 U/L    ALT 40 0 - 40 U/L    AST 45 (H) 0 - 39 U/L   Protime-INR   Result Value Ref Range    Protime 12.7 (H) 9.3 - 12.4 sec    INR 1.1    Microscopic Urinalysis   Result Value Ref Range    WBC, UA 1-3 0 - 5 /HPF    RBC, UA >20 0 - 2 /HPF    Bacteria, UA FEW (A) /HPF    Amorphous, UA FEW    POCT Glucose   Result Value Ref Range    Meter Glucose 145 (H) 74 - 99 mg/dL   EKG 12 Lead   Result Value Ref Range    Ventricular Rate 88 BPM    Atrial Rate 88 BPM    P-R Interval 152 ms    QRS Duration 90 ms    Q-T Interval 396 ms    QTc Calculation (Bazett) 479 ms    P Axis 20 degrees    R Axis -58 degrees    T Axis 147 degrees       RADIOLOGY:  Interpreted by Radiologist.  XR CHEST PORTABLE   Final Result      Questionable discrete right parahilar infiltrate. Hypoventilation of the lower lung bases. EKG: This EKG is signed and interpreted by the EP. Time: 23:40  Rate: 88  Rhythm: Sinus  Interpretation: non-specific EKG  Comparison: no previous EKG available    ------------------------- NURSING NOTES AND VITALS REVIEWED ---------------------------   The nursing notes within the ED encounter and vital signs as below have been reviewed by myself. BP (!) 143/98   Pulse 98   Temp 97.6 °F (36.4 °C) (Axillary)   Resp 28   Ht 5' 6\" (1.676 m)   Wt 175 lb (79.4 kg)   SpO2 97%   BMI 28.25 kg/m²   Oxygen Saturation Interpretation: Normal    The patients available past medical records and past encounters were reviewed.       ------------------------- ED COURSE/MEDICAL DECISION MAKING----------------------  Medications   vancomycin 1.5 g in dextrose 5% 300 mL IVPB (1.5 g Intravenous New Bag 11/30/18 0307) ampicillin-sulbactam (UNASYN) 3 g ivpb minibag (0 g Intravenous Stopped 11/30/18 0151)   0.9 % sodium chloride bolus (0 mLs Intravenous Stopped 11/30/18 0226)   0.9 % sodium chloride bolus (0 mLs Intravenous Stopped 11/30/18 0308)   LORazepam (ATIVAN) injection 0.5 mg (0.5 mg Intravenous Given 11/30/18 0246)       Medical Decision Making:    Pt presents for shortness of breath beginning tonight as he was sitting in his chair and started experiencing trouble breathing accompanied by diaphoresis around 9:30 PM. Pt is normally non-verbal. He was allegedly treated for pneumonia recently. Pt is a DNR-CCA no intubation as of September 2018. Aspiration pneumonia suspected. Patient placed on BiPAP with improvement of his respiratory status. Unasyn and Vancomycin medication given in the ED. Labs and imaging obtained, reviewed; results discussed with patient's family. Patient admitted to telemetry for further management. This patient's ED course included: a personal history and physicial examination    This patient has remained hemodynamically stable during their ED course. Re-Evaluations:           Re-evaluation. Patient       Consultations:    Spoke with Dr. Ranjit Melvin (Medicine). Discussed case. They will admit this patient. Counseling: The emergency provider has spoken with the patient family and discussed todays results, in addition to providing specific details for the plan of care and counseling regarding the diagnosis and prognosis. Questions are answered at this time and they are agreeable with the plan.     ---------------------------- IMPRESSION AND DISPOSITION ---------------------------------    IMPRESSION  1. Aspiration pneumonia, unspecified aspiration pneumonia type, unspecified laterality, unspecified part of lung ([address]    2.  Acute respiratory failure, unspecified whether with hypoxia or hypercapnia (HCC)    3. Elevated troponin        DISPOSITION  Disposition: Admit to telemetry  Patient

## 2018-12-02 LAB — URINE CULTURE, ROUTINE: NORMAL

## 2018-12-05 LAB
BLOOD CULTURE, ROUTINE: NORMAL
CULTURE, BLOOD 2: NORMAL

## 2018-12-07 LAB
EKG ATRIAL RATE: 88 BPM
EKG P AXIS: 20 DEGREES
EKG P-R INTERVAL: 152 MS
EKG Q-T INTERVAL: 396 MS
EKG QRS DURATION: 90 MS
EKG QTC CALCULATION (BAZETT): 479 MS
EKG R AXIS: -58 DEGREES
EKG T AXIS: 147 DEGREES
EKG VENTRICULAR RATE: 88 BPM

## (undated) DEVICE — 3M™ MICROPORE™ TAPE, 1530-2: Brand: 3M™ MICROPORE™

## (undated) DEVICE — Device

## (undated) DEVICE — SPONGE,DRAIN,NONWVN,4"X4",6PLY,STRL,LF: Brand: MEDLINE

## (undated) DEVICE — TOWEL,OR,DSP,ST,BLUE,STD,6/PK,12PK/CS: Brand: MEDLINE

## (undated) DEVICE — GRADUATE TRIANG MEASURE 1000ML BLK PRNT

## (undated) DEVICE — BINDER ABD 2XL H12XL60 75IN UNISX STD E 4 PNL DISPOSABLE